# Patient Record
(demographics unavailable — no encounter records)

---

## 2017-03-17 NOTE — PCM.OPNOTE
- General Post-Op/Procedure Note


Date of Surgery/Procedure: 03/17/17


Operative Procedure(s): lap cholecystectomy


Findings: 





critical view obtained. 





Pre Op Diagnosis: gallstones


Post-Op Diagnosis: Same


Anesthesia Technique: General ET tube, Local (8 ml 1 % lido with epi/0.5% 

buvipicaine)


Primary Surgeon: Deo Leigh


Anesthesia Provider: Yohana Jensen


Pathology: 





gallbladder and contents. 


Complications: None


Condition: Good


Free Text/Narrative:: 





see dictation [id number]

## 2017-03-17 NOTE — OR
DATE OF OPERATION:  03/17/2017

 

SURGEON:  Deo Leigh MD

 

PROCEDURE PERFORMED:  Laparoscopic cholecystectomy.

 

PREOPERATIVE DIAGNOSIS:  Cholelithiasis, asymptomatic, in a type 2 diabetic.

 

POSTOPERATIVE DIAGNOSIS:  Cholelithiasis, asymptomatic, in a type 2 diabetic.

 

INDICATIONS FOR PROCEDURE:  This is a 52-year-old white male who was recently

found to have gallstones, while he is asymptomatic, he is a type 2 diabetic and

on the basis of this, he was recommended to have a laparoscopic cholecystectomy.

 

INTRAOPERATIVE FINDINGS:  A total of 8 mL of our local mixture was used, 1%

lidocaine with epinephrine and 0.5% bupivacaine.  Critical view was obtained of

the gallbladder, cystic duct and cystic artery.

 

DESCRIPTION OF OPERATION:  After an excellent general anesthetic was

administered, the patient was prepped and draped in the usual sterile manner.

Ioban was placed on the anterior abdominal wall.  The area just above the

umbilicus was then infiltrated with one-one mixture with 1% lidocaine with

epinephrine, 0.5% bupivacaine.  A midline incision was then made above the

umbilicus and blunt dissection was carried out exposing the midline fascia.  Two

stay sutures of 0 Vicryl were placed on either side of the midline, which was

then incised.  The abdominal cavity was entered and a 10.5 mm Mary Alice trocar was

inserted into the abdominal wall after palpation revealed no adhesions.  Under

direct visualization, three 5-mm ports were placed, 1 in the midline epigastrium

and 2 below the right costal margin at the level of the midclavicular and

anterior axillary line.  This was done by infiltrating with our local mixture,

making stab incisions with a #15 scalpel blade, and inserting the trocars.  The

gallbladder was grasped and retracted in a cephalad fashion.  The infundibulum

was grasped.  Careful blunt dissection was carried out exposing the cystic duct

and cystic artery.  Two clips were placed proximally on the cystic duct and one

distally.  This was then transected.  The process was repeated on the cystic

artery with two clips proximal and one distal.  Careful hook cautery dissection

was then carried out, dissecting the gallbladder from the gallbladder fossa.

The specimen was then passed into a specimen bag and delivered out through the

umbilicus.  After assuring excellent hemostasis, the trocars were removed under

direct visualization and the pneumoperitoneum was released.  The periumbilical

incision was closed with a figure-of-eight 0 Vicryl.  Followed by tying the 2

stay sutures together.  Staples were used to close the skin.  Needle, sponge,

and instrument counts were reported as correct.  The patient tolerated the

procedure well and was taken to recovery room in good condition.

 

Job#: [id number]

DD: 03/17/2017 1100

DT: 03/17/2017 1620 AS/MODL